# Patient Record
Sex: MALE | Race: WHITE
[De-identification: names, ages, dates, MRNs, and addresses within clinical notes are randomized per-mention and may not be internally consistent; named-entity substitution may affect disease eponyms.]

---

## 2019-11-23 ENCOUNTER — HOSPITAL ENCOUNTER (EMERGENCY)
Dept: HOSPITAL 47 - EC | Age: 39
Discharge: HOME | End: 2019-11-23
Payer: COMMERCIAL

## 2019-11-23 VITALS — TEMPERATURE: 98 F

## 2019-11-23 VITALS — RESPIRATION RATE: 16 BRPM | HEART RATE: 71 BPM | DIASTOLIC BLOOD PRESSURE: 86 MMHG | SYSTOLIC BLOOD PRESSURE: 118 MMHG

## 2019-11-23 DIAGNOSIS — S22.31XA: Primary | ICD-10-CM
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
## 2019-11-23 DIAGNOSIS — Z91.018: ICD-10-CM

## 2019-11-23 DIAGNOSIS — Z88.0: ICD-10-CM
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
## 2019-11-23 DIAGNOSIS — Z53.20: ICD-10-CM

## 2019-11-23 PROCEDURE — 99284 EMERGENCY DEPT VISIT MOD MDM: CPT

## 2019-11-23 NOTE — XR
EXAMINATION TYPE: PA chest and right rib series

 

DATE OF EXAM: 11/23/2019

 

COMPARISON: None

 

HISTORY: 38-year-old male with fall and rib pain

 

TECHNIQUE: 5 views

 

FINDINGS:  

Heart upper limits of normal in size. Aorta within normal limits. Mild interstitial prominence as a c
hronic appearance. No consolidation, pneumothorax, or pleural effusion.

 

Subtle nondisplaced fracture of the right anterolateral seventh rib.

 

 

IMPRESSION:  

Subtle nondisplaced fracture of the right anterolateral seventh rib. No underlying acute cardiopulmon
christopher process seen.

## 2019-11-23 NOTE — ED
General Adult HPI





- General


Chief complaint: Abdominal Pain


Stated complaint: Rib Pain


Time Seen by Provider: 11/23/19 19:07


Source: patient


Mode of arrival: ambulatory


Limitations: no limitations





- History of Present Illness


Initial comments: 


38-year-old male patient presents to the emergency department today for 

evaluation of right lateral rib pain.  Patient states around 5:30 this afternoon

he was jumping up to grab onto gymnastic rings when he fell landing on the right

side.  States that it was on and on padded region of the floor.  Denies hitting 

his head or losing consciousness.  Denies any neck or back pain.  Patient states

he is having increased pain to the area with deep breathing.  Denies any cough 

or hemoptysis.  Denies any shortness of breath.  Denies any abdominal pain.  

Denies hematuria. Patient denies any headache, chest pain, shortness of breath, 

dizziness, weakness, abdominal pain, nausea, vomiting, or difficulties with 

bowel movements or urination.








- Related Data


                                Home Medications











 Medication  Instructions  Recorded  Confirmed


 


Multivitamins, Thera [Multivitamin] 1 each PO DAILY 09/13/16 09/14/16








                                  Previous Rx's











 Medication  Instructions  Recorded


 


Hydrocodone/Acetaminophen [Norco 1 tab PO Q6HR PRN #12 tab 11/23/19





5-325]  


 


Ibuprofen [Motrin] 600 mg PO Q8HR PRN #30 tab 11/23/19


 


Lidocaine 5% Patch [Lidoderm] 1 patch TOPICAL DAILY #30 patch 11/23/19











                                    Allergies











Allergy/AdvReac Type Severity Reaction Status Date / Time


 


almond Allergy  Nausea & Verified 11/23/19 18:57





   Vomiting  


 


clams Allergy  Unknown Verified 11/23/19 18:57


 


Penicillins Allergy  Unknown Verified 09/13/16 09:07





   Childhood  














Review of Systems


ROS Statement: 


Those systems with pertinent positive or pertinent negative responses have been 

documented in the HPI.





ROS Other: All systems not noted in ROS Statement are negative.





Past Medical History


Past Medical History: No Reported History


History of Any Multi-Drug Resistant Organisms: None Reported


Past Surgical History: No Surgical Hx Reported


Past Anesthesia/Blood Transfusion Reactions: No Reported Reaction


Additional Past Anesthesia/Blood Transfusion Reaction / Comment(s): no prior sx 

hx


Past Psychological History: No Psychological Hx Reported


Smoking Status: Never smoker


Past Alcohol Use History: Occasional


Past Drug Use History: None Reported





- Past Family History


  ** Mother


Family Medical History: Cancer





  ** Father


Family Medical History: Cancer





General Exam


Limitations: no limitations


General appearance: alert, in no apparent distress, other (This is a well-

developed, well-nourished adult male patient in no acute distress.  Vital signs 

upon presentation are temperature 98.0F, pulse 72, respirations 18, blood 

pressure 116/79, pulse ox 99% on room air per)


Eye exam: Present: normal appearance, PERRL, EOMI.  Absent: scleral icterus, 

conjunctival injection, periorbital swelling


ENT exam: Present: normal exam, normal oropharynx, mucous membranes moist


Neck exam: Present: normal inspection, other (Nontender, no step-off, no 

deformity to firm midline palpation of the posterior cervical spine.  Full range

 of motion without pain or limitation.).  Absent: tenderness, meningismus, 

lymphadenopathy


Respiratory exam: Present: normal lung sounds bilaterally, chest wall tenderness

 (Right lateral ribs).  Absent: respiratory distress, wheezes, rales, rhonchi, 

stridor


Cardiovascular Exam: Present: regular rate, normal rhythm, normal heart sounds. 

 Absent: systolic murmur, diastolic murmur, rubs, gallop, clicks


GI/Abdominal exam: Present: soft, normal bowel sounds.  Absent: distended, 

tenderness, guarding, rebound, rigid


Back exam: Present: other (Nontender, no step-off, no deformity to firm midline 

palpation of the thoracic and lumbar vertebrae. Full range of motion without 

pain or limitation.).  Absent: CVA tenderness (R), CVA tenderness (L)


Neurological exam: Present: alert, oriented X3, CN II-XII intact


Psychiatric exam: Present: normal affect, normal mood


Skin exam: Present: warm, dry, intact, normal color.  Absent: rash





Course


                                   Vital Signs











  11/23/19 11/23/19





  18:53 20:44


 


Temperature 98.0 F 98.0 F


 


Pulse Rate 72 71


 


Respiratory 18 16





Rate  


 


Blood Pressure 116/79 118/86


 


O2 Sat by Pulse 99 98





Oximetry  














Medical Decision Making





- Medical Decision Making


38-year-old male patient presents to the emergency department today for 

evaluation of right rib pain after experiencing a fall.  Physical examination 

did reveal tenderness over the right lateral ribs.  Lungs are clear to 

auscultation with good air movement.  X-rays were obtained and did reveal a 

nondisplaced fracture of the right seventh rib.  Patient was given incentive 

spirometer education.  He is given prescriptions for pain management.  He is 

instructed follow up with his primary care physician for recheck in 1-2 days.  

Return parameters discussed in detail.  He verbalizes understanding and agrees 

with this plan.








- Radiology Data


Radiology results: report reviewed, image reviewed


5 views of the right ribs and chest are obtained.  Report was reviewed in its 

entirety.  Impression by Dr. Rouse shows subtle nondisplaced fracture of the 

right anterolateral seventh rib.  No underlying acute cardiopulmonary process 

seen.





Disposition


Clinical Impression: 


 Right rib fracture





Disposition: HOME SELF-CARE


Condition: Good


Instructions (If sedation given, give patient instructions):  Rib Fracture (ED)


Additional Instructions: 


Take medications as directed.  Perform incentive spirometry and coughing and 

deep breathing exercises as directed while awake.  Follow-up with the primary 

care physician for recheck in 1-2 days.  Return to the emergency department 

immediately for any new, worsening, or concerning symptoms.


Prescriptions: 


Lidocaine 5% Patch [Lidoderm] 1 patch TOPICAL DAILY #30 patch


Ibuprofen [Motrin] 600 mg PO Q8HR PRN #30 tab


 PRN Reason: Pain


Hydrocodone/Acetaminophen [Norco 5-325] 1 tab PO Q6HR PRN #12 tab


 PRN Reason: Pain


Is patient prescribed a controlled substance at d/c from ED?: Yes


When asked, does pt state using other controlled substances?: No


If prescribed controlled substance>3 days was MAPS reviewed?: Prescribed <3 Days


If opioid is for acute pain is fill amount 7 days or less?: Yes


If Rx opioid, was Start Talking consent form obtained?: Yes


Referrals: 


None,Stated [Primary Care Provider] - 1-2 days


Time of Disposition: 20:07

## 2022-12-11 ENCOUNTER — HOSPITAL ENCOUNTER (OUTPATIENT)
Dept: HOSPITAL 47 - EC | Age: 42
Setting detail: OBSERVATION
LOS: 1 days | Discharge: HOME | End: 2022-12-12
Attending: INTERNAL MEDICINE | Admitting: INTERNAL MEDICINE
Payer: COMMERCIAL

## 2022-12-11 DIAGNOSIS — G45.9: Primary | ICD-10-CM

## 2022-12-11 DIAGNOSIS — E78.5: ICD-10-CM

## 2022-12-11 DIAGNOSIS — Z88.0: ICD-10-CM

## 2022-12-11 DIAGNOSIS — Z81.2: ICD-10-CM

## 2022-12-11 DIAGNOSIS — Z82.3: ICD-10-CM

## 2022-12-11 DIAGNOSIS — Z81.1: ICD-10-CM

## 2022-12-11 DIAGNOSIS — Z80.9: ICD-10-CM

## 2022-12-11 DIAGNOSIS — Z82.49: ICD-10-CM

## 2022-12-11 DIAGNOSIS — Z79.899: ICD-10-CM

## 2022-12-11 DIAGNOSIS — I37.1: ICD-10-CM

## 2022-12-11 DIAGNOSIS — I07.1: ICD-10-CM

## 2022-12-11 DIAGNOSIS — Z79.82: ICD-10-CM

## 2022-12-11 DIAGNOSIS — Z83.3: ICD-10-CM

## 2022-12-11 LAB
ALBUMIN SERPL-MCNC: 4.6 G/DL (ref 3.5–5)
ALP SERPL-CCNC: 63 U/L (ref 38–126)
ALT SERPL-CCNC: 16 U/L (ref 4–49)
ANION GAP SERPL CALC-SCNC: 10 MMOL/L
APTT BLD: 24.1 SEC (ref 22–30)
AST SERPL-CCNC: 14 U/L (ref 17–59)
BASOPHILS # BLD AUTO: 0 K/UL (ref 0–0.2)
BASOPHILS NFR BLD AUTO: 1 %
BUN SERPL-SCNC: 15 MG/DL (ref 9–20)
CALCIUM SPEC-MCNC: 9.5 MG/DL (ref 8.4–10.2)
CHLORIDE SERPL-SCNC: 104 MMOL/L (ref 98–107)
CHOLEST SERPL-MCNC: 178 MG/DL (ref 0–200)
CO2 SERPL-SCNC: 26 MMOL/L (ref 22–30)
EOSINOPHIL # BLD AUTO: 0.2 K/UL (ref 0–0.7)
EOSINOPHIL NFR BLD AUTO: 4 %
ERYTHROCYTE [DISTWIDTH] IN BLOOD BY AUTOMATED COUNT: 5.11 M/UL (ref 4.3–5.9)
ERYTHROCYTE [DISTWIDTH] IN BLOOD: 12.2 % (ref 11.5–15.5)
GLUCOSE BLD-MCNC: 109 MG/DL (ref 70–110)
GLUCOSE SERPL-MCNC: 107 MG/DL (ref 74–99)
HCT VFR BLD AUTO: 42.7 % (ref 39–53)
HDLC SERPL-MCNC: 53.7 MG/DL (ref 40–60)
HGB BLD-MCNC: 15.8 GM/DL (ref 13–17.5)
INR PPP: 1 (ref ?–1.2)
LDLC SERPL CALC-MCNC: 111.6 MG/DL (ref 0–131)
LYMPHOCYTES # SPEC AUTO: 1.3 K/UL (ref 1–4.8)
LYMPHOCYTES NFR SPEC AUTO: 34 %
MCH RBC QN AUTO: 30.8 PG (ref 25–35)
MCHC RBC AUTO-ENTMCNC: 36.9 G/DL (ref 31–37)
MCV RBC AUTO: 83.5 FL (ref 80–100)
MONOCYTES # BLD AUTO: 0.2 K/UL (ref 0–1)
MONOCYTES NFR BLD AUTO: 6 %
NEUTROPHILS # BLD AUTO: 2 K/UL (ref 1.3–7.7)
NEUTROPHILS NFR BLD AUTO: 52 %
PLATELET # BLD AUTO: 183 K/UL (ref 150–450)
POTASSIUM SERPL-SCNC: 4.4 MMOL/L (ref 3.5–5.1)
PROT SERPL-MCNC: 7.3 G/DL (ref 6.3–8.2)
PT BLD: 10.9 SEC (ref 9–12)
SODIUM SERPL-SCNC: 140 MMOL/L (ref 137–145)
TRIGL SERPL-MCNC: 63.5 MG/DL (ref 0–149)
VLDLC SERPL CALC-MCNC: 12.7 MG/DL (ref 5–40)
WBC # BLD AUTO: 3.9 K/UL (ref 3.8–10.6)

## 2022-12-11 PROCEDURE — 70551 MRI BRAIN STEM W/O DYE: CPT

## 2022-12-11 PROCEDURE — 85730 THROMBOPLASTIN TIME PARTIAL: CPT

## 2022-12-11 PROCEDURE — 95816 EEG AWAKE AND DROWSY: CPT

## 2022-12-11 PROCEDURE — 82375 ASSAY CARBOXYHB QUANT: CPT

## 2022-12-11 PROCEDURE — 99285 EMERGENCY DEPT VISIT HI MDM: CPT

## 2022-12-11 PROCEDURE — 94760 N-INVAS EAR/PLS OXIMETRY 1: CPT

## 2022-12-11 PROCEDURE — 70450 CT HEAD/BRAIN W/O DYE: CPT

## 2022-12-11 PROCEDURE — 80061 LIPID PANEL: CPT

## 2022-12-11 PROCEDURE — 71046 X-RAY EXAM CHEST 2 VIEWS: CPT

## 2022-12-11 PROCEDURE — 85610 PROTHROMBIN TIME: CPT

## 2022-12-11 PROCEDURE — 83036 HEMOGLOBIN GLYCOSYLATED A1C: CPT

## 2022-12-11 PROCEDURE — 80053 COMPREHEN METABOLIC PANEL: CPT

## 2022-12-11 PROCEDURE — 83735 ASSAY OF MAGNESIUM: CPT

## 2022-12-11 PROCEDURE — 36415 COLL VENOUS BLD VENIPUNCTURE: CPT

## 2022-12-11 PROCEDURE — 93005 ELECTROCARDIOGRAM TRACING: CPT

## 2022-12-11 PROCEDURE — 70496 CT ANGIOGRAPHY HEAD: CPT

## 2022-12-11 PROCEDURE — 84484 ASSAY OF TROPONIN QUANT: CPT

## 2022-12-11 PROCEDURE — 85025 COMPLETE CBC W/AUTO DIFF WBC: CPT

## 2022-12-11 PROCEDURE — 70498 CT ANGIOGRAPHY NECK: CPT

## 2022-12-11 PROCEDURE — 93306 TTE W/DOPPLER COMPLETE: CPT

## 2022-12-11 RX ADMIN — CEFAZOLIN SCH MLS/HR: 330 INJECTION, POWDER, FOR SOLUTION INTRAMUSCULAR; INTRAVENOUS at 15:37

## 2022-12-11 NOTE — CT
EXAMINATION TYPE: CT angio head neck

 

DATE OF EXAM: 12/11/2022

 

COMPARISON: None

 

HISTORY: stroke

 

CT DLP: 600.3 mGycm

 

CONTRAST: 

Performed with IV Contrast, patient injected with 65 mL of Isovue 370.

 

Combination Contrast CTA cervical carotids and [place] CTA cervical carotids with 3-D recons
truction

 

Contrast CTA of the cervical carotids was performed 3-D reconstruction imaging obtained at a separate
 workstation.  

 

Right carotid system: No significant plaque is seen of the right common carotid artery.  There is No 
significant plaque also noted at the carotid bulb and proximal ICA.  No significant diameter reductio
n.  ECA is patent.  Right vertebral artery appears unremarkable.  

 

Left carotid system: No significant plaque is seen of the left common carotid artery.  There is No si
gnificant plaque also noted at the carotid bulb and proximal ICA.  No significant diameter reduction.
  ECA is patent. Left vertebral artery appears unremarkable. 

 

IMPRESSION: 

 

1. No significant diameter reduction to account for the patient's symptoms. 

 

CTA [place] with 3-D reconstruction

 

Contrast CTA of the [place] was performed 3-D reconstruction imaging obtained at a separate 
workstation.  

 

Vertebrobasilar system as well as intracranial portions of the internal carotid arteries and their ma
andie tributaries are patent. Diminutive right vertebral artery. I do not see evidence for sizable aneu
rysm or vascular malformation.  Please note MRI provides greater sensitivity and specificity.  Visual
ized brain appears grossly unremarkable. 

 

IMPRESSION:

 

1. There is diminutive right vertebral artery. Otherwise unremarkable study.

 

NASCET criteria was used in interpretation of this exam?

## 2022-12-11 NOTE — CT
EXAMINATION TYPE: CT brain wo con for TPA

 

DATE OF EXAM: 12/11/2022

 

COMPARISON: None

 

HISTORY: stroke

 

CT DLP: 1180.8 mGycm

 

Unenhanced CT of the brain was performed.  

 

The ventricles, basal cisterns and sulci overlying the cerebral convexities demonstrate a normal appe
arance.  

 

There is no evidence for intracranial hemorrhage or sulcal effacement.  

 

No mass effects are seen.  

 

Osseous calvarium is intact.

 

If symptoms persist consider MRI as clinically warranted.  

 

IMPRESSION:

 

1.  No acute intracranial process is seen at this time.

## 2022-12-11 NOTE — P.HPIM
History of Present Illness


H&P Date: 12/11/22


Chief Complaint: TIA





 42-year-old male with no PMH presenting to the emergency Department with 

blurred vision in the right eye, tongue numbness and aphasia. Onset of symptoms 

was around 9 AM. Patient was hunting at the time.  He states that he could think

what he wanted to say but he could not put it out.  Patient was unable to say 

his daughter's name. Patient also had a mild headache rated 3/10 bitemporal.  

The symptoms lasted for about an hour, currently resolved.  No focal weakness or

numbness. No difficulty with walking or extremity weakness. No history of 

similar symptoms previously. The father side of his family had problems with 

heart attacks and strokes, age of onset the 50s and 60s.





Evaluation in the emergency department revealed normal vital signs.  Labs 

unremarkable.  Head CT scan negative.  Head and neck CT angiogram negative.  

Chest x-ray unremarkable.





Review of Systems





Complete review of system performed, pertinent positives per HPI, otherwise 

negative





Past Medical History


Past Medical History: No Reported History


History of Any Multi-Drug Resistant Organisms: None Reported


Past Surgical History: No Surgical Hx Reported


Past Anesthesia/Blood Transfusion Reactions: No Reported Reaction


Additional Past Anesthesia/Blood Transfusion Reaction / Comment(s): no prior sx 

hx


Past Psychological History: No Psychological Hx Reported


Smoking Status: Never smoker


Past Alcohol Use History: Occasional


Past Drug Use History: None Reported





- Past Family History


  ** Mother


Family Medical History: Cancer





  ** Father


Family Medical History: Cancer





Medications and Allergies


                                Home Medications











 Medication  Instructions  Recorded  Confirmed  Type


 


No Known Home Medications  12/11/22 12/11/22 History








                                    Allergies











Allergy/AdvReac Type Severity Reaction Status Date / Time


 


clams Allergy  Unknown Verified 12/11/22 12:59


 


Penicillins Allergy  Unknown Verified 12/11/22 12:59





   Childhood  


 


almond AdvReac  Nausea & Verified 12/11/22 12:59





   Vomiting  














Physical Exam


Vitals: 


                                   Vital Signs











  Temp Pulse Resp BP Pulse Ox


 


 12/11/22 11:30   61  18  121/82  97


 


 12/11/22 10:58   65  18  116/77  99


 


 12/11/22 10:13  97.5 F L  64  18  134/84  100








                                Intake and Output











 12/10/22 12/11/22 12/11/22





 22:59 06:59 14:59


 


Other:   


 


  Weight   81.647 kg














Constitutional: No acute distress, conversant, pleasant


Eyes:Anicteric sclerae, moist conjunctiva, no lid-lag, PERRLA, 


ENMT: Oropharynx clear, no erythema, exudates


Neck: Supple, FROM, no masses, or JVD, No carotid bruits, No thyromegaly


Lungs: Clear to auscultation, Clear to percussion, Normal respiratory effort, no

accessory muscle use 


Cardiovascular: Heart regular in rate and rhythm, No murmurs, gallops, or rubs, 

No peripheral edema


Abdominal: Soft, Nontender, no guarding, rebound or rigidity, Normoactive bowel 

sounds, No hepatomegaly, No splenomegaly, No palpable mass 


Skin: Normal temperature, tone, texture, turgor, no induration, No subcutaneous 

nodules, No rash, lesions, No ulcers


Extremities: No digital cyanosis, No clubbing, Pedal pulses intact and 

symmetrical, Radial pulses intact and symmetrical, No calf tenderness 


Psychiatric: Alert and oriented to person, place and time, appropriate affect, 

intact judgement         


Neuro: Muscles Strength 5/5 in all 4 extremities, Sensation to light touch 

grossly present throughout, Cranial nerves II-XII grossly intact, no focal 

sensory deficits








Results


CBC & Chem 7: 


                                 12/11/22 10:40





                                 12/11/22 10:40


Labs: 


                  Abnormal Lab Results - Last 24 Hours (Table)











  12/11/22 Range/Units





  10:40 


 


Glucose  107 H  (74-99)  mg/dL


 


AST  14 L  (17-59)  U/L














Assessment and Plan


Plan: 





Transient ischemic attack


MRI brain


Echocardiogram


Monitor on telemetry


Check lipid profile and HbA1c


Neurology evaluation





Bitemporal headaches


Possibly related to above


Resolved





Admit to observation.

## 2022-12-11 NOTE — XR
EXAMINATION TYPE: XR chest 2V

 

DATE OF EXAM: 12/11/2022

 

COMPARISON: 11/23/19

 

HISTORY: Chest pain

 

TECHNIQUE:  Frontal and lateral views of the chest are obtained.

 

FINDINGS:  

 

There is no focal air space opacity.

 

No evidence for pneumothorax.  No pleural effusion.

 

The cardiac silhouette size is within normal limits.

 

The osseous structures are grossly intact.

 

IMPRESSION:  

 

1.  No acute cardiopulmonary process.

## 2022-12-11 NOTE — ED
General Adult HPI





- General


Chief complaint: Neuro Symptoms/Deficit


Stated complaint: Aphasia


Time Seen by Provider: 12/11/22 10:20


Source: patient, family, RN notes reviewed


Mode of arrival: ambulatory


Limitations: no limitations





- History of Present Illness


Initial comments: 





Patient is a pleasant 42-year-old male presenting to the emergency Department 

with facial numbness and aphasia.  Onset of symptoms was around 9 AM.  Patient 

was hunting at the time.  Patient felt odd sensation of his face.  Patient also 

had a mild headache rated 3/10 left temporal.  This headache does continue and 

does continue to be mild.  Not sudden onset or severe.  Patient did have some 

blurry vision from his right eye.  No difficulty with walking or extremity 

weakness.  Patient had difficulty getting his words out.  Patient was unable to 

say his daughter's name.  The symptoms have improved and patient and spouse feel

like he is talking normally at this time.  No odd facial sensation anymore.  No 

history of similar symptoms previously.  There was questionable history of 

seizure years ago.





- Related Data


                                Home Medications











 Medication  Instructions  Recorded  Confirmed


 


No Known Home Medications  12/11/22 12/11/22











                                    Allergies











Allergy/AdvReac Type Severity Reaction Status Date / Time


 


clams Allergy  Unknown Verified 12/11/22 12:59


 


Penicillins Allergy  Unknown Verified 12/11/22 12:59





   Childhood  


 


almond AdvReac  Nausea & Verified 12/11/22 12:59





   Vomiting  














Review of Systems


ROS Statement: 


Those systems with pertinent positive or pertinent negative responses have been 

documented in the HPI.





ROS Other: All systems not noted in ROS Statement are negative.


Constitutional: Denies: fever


Eyes: Denies: eye pain


ENT: Denies: ear pain


Respiratory: Denies: cough


Cardiovascular: Denies: chest pain


Endocrine: Denies: fatigue


Gastrointestinal: Denies: abdominal pain


Genitourinary: Denies: dysuria


Musculoskeletal: Denies: back pain


Skin: Denies: rash


Neurological: Reports: as per HPI, headache.  Denies: confusion





Past Medical History


Past Medical History: No Reported History


History of Any Multi-Drug Resistant Organisms: None Reported


Past Surgical History: No Surgical Hx Reported


Past Anesthesia/Blood Transfusion Reactions: No Reported Reaction


Additional Past Anesthesia/Blood Transfusion Reaction / Comment(s): no prior sx 

hx


Past Psychological History: No Psychological Hx Reported


Smoking Status: Never smoker


Past Alcohol Use History: Occasional


Past Drug Use History: None Reported





- Past Family History


  ** Mother


Family Medical History: Cancer





  ** Father


Family Medical History: Cancer





General Exam


Limitations: no limitations


General appearance: alert, in no apparent distress


Head exam: Present: atraumatic, normocephalic, other (No temporal tenderness to 

palpation)


Eye exam: Present: normal appearance, PERRL, EOMI


ENT exam: Present: normal oropharynx


Neck exam: Present: normal inspection


Respiratory exam: Present: normal lung sounds bilaterally


Cardiovascular Exam: Present: regular rate, normal rhythm


GI/Abdominal exam: Present: soft.  Absent: tenderness


Extremities exam: Present: normal inspection


Neurological exam: Present: alert, CN II-XII intact.  Absent: motor sensory 

deficit


  ** Expanded


Neurological exam: Present: protecting the airway


Speech: Present: fluid speech


Cranial nerves: EOM's Intact: Normal, Facial Sensation: Normal


Cerebellar function: Finger to Nose: Normal


Sensory exam: Upper Extremity Light Touch: Normal, Lower Extremity Light Touch: 

Normal


Motor strength exam: RUE: 5, LUE: 5, RLE: 5, LLE: 5


Eye Response: (4) open spontaneously


Motor Response: (6) obeys commands


Verbal Response: (5) oriented


Psychiatric exam: Present: normal affect, normal mood


Skin exam: Present: normal color





Course


                                   Vital Signs











  12/11/22 12/11/22 12/11/22





  10:13 10:58 11:30


 


Temperature 97.5 F L  


 


Pulse Rate 64 65 61


 


Respiratory 18 18 18





Rate   


 


Blood Pressure 134/84 116/77 121/82


 


O2 Sat by Pulse 100 99 97





Oximetry   














- Reevaluation(s)


Reevaluation #1: 





12/11/22 10:46


Patient is not being considered a TPA candidate secondary to resolution of 

symptoms.  Risks are felt to outweigh the benefit.


12/11/22 10:53


Dr. Valenzuela does confirm patient is not a TPA candidate





EKG Findings





- EKG Results:


EKG: interpreted by ERMD, sinus rhythm, normal axis, normal QRS, normal ST/T





Medical Decision Making





- Medical Decision Making





Patient was reevaluated and remained symptom-free.  Case was discussed with Dr. Bolden, who will admit covering hospital call.





- Lab Data


Result diagrams: 


                                 12/11/22 10:40





                                 12/11/22 10:40


                                   Lab Results











  12/11/22 12/11/22 12/11/22 Range/Units





  10:40 10:40 10:40 


 


WBC  3.9    (3.8-10.6)  k/uL


 


RBC  5.11    (4.30-5.90)  m/uL


 


Hgb  15.8    (13.0-17.5)  gm/dL


 


Hct  42.7    (39.0-53.0)  %


 


MCV  83.5    (80.0-100.0)  fL


 


MCH  30.8    (25.0-35.0)  pg


 


MCHC  36.9    (31.0-37.0)  g/dL


 


RDW  12.2    (11.5-15.5)  %


 


Plt Count  183    (150-450)  k/uL


 


MPV  7.5    


 


Neutrophils %  52    %


 


Lymphocytes %  34    %


 


Monocytes %  6    %


 


Eosinophils %  4    %


 


Basophils %  1    %


 


Neutrophils #  2.0    (1.3-7.7)  k/uL


 


Lymphocytes #  1.3    (1.0-4.8)  k/uL


 


Monocytes #  0.2    (0-1.0)  k/uL


 


Eosinophils #  0.2    (0-0.7)  k/uL


 


Basophils #  0.0    (0-0.2)  k/uL


 


Hyperchromasia  Slight    


 


PT   10.9   (9.0-12.0)  sec


 


INR   1.0   (<1.2)  


 


APTT   24.1   (22.0-30.0)  sec


 


Carbon Monoxide, Quant     (<10.0)  %


 


Sodium    140  (137-145)  mmol/L


 


Potassium    4.4  (3.5-5.1)  mmol/L


 


Chloride    104  ()  mmol/L


 


Carbon Dioxide    26  (22-30)  mmol/L


 


Anion Gap    10  mmol/L


 


BUN    15  (9-20)  mg/dL


 


Creatinine    1.00  (0.66-1.25)  mg/dL


 


Est GFR (CKD-EPI)AfAm    >90  (>60 ml/min/1.73 sqM)  


 


Est GFR (CKD-EPI)NonAf    >90  (>60 ml/min/1.73 sqM)  


 


Glucose    107 H  (74-99)  mg/dL


 


POC Glucose (mg/dL)     ()  mg/dL


 


POC Glu Operater ID     


 


Calcium    9.5  (8.4-10.2)  mg/dL


 


Total Bilirubin    1.3  (0.2-1.3)  mg/dL


 


AST    14 L  (17-59)  U/L


 


ALT    16  (4-49)  U/L


 


Alkaline Phosphatase    63  ()  U/L


 


Troponin I     (0.000-0.034)  ng/mL


 


Total Protein    7.3  (6.3-8.2)  g/dL


 


Albumin    4.6  (3.5-5.0)  g/dL














  12/11/22 12/11/22 12/11/22 Range/Units





  10:40 10:40 10:58 


 


WBC     (3.8-10.6)  k/uL


 


RBC     (4.30-5.90)  m/uL


 


Hgb     (13.0-17.5)  gm/dL


 


Hct     (39.0-53.0)  %


 


MCV     (80.0-100.0)  fL


 


MCH     (25.0-35.0)  pg


 


MCHC     (31.0-37.0)  g/dL


 


RDW     (11.5-15.5)  %


 


Plt Count     (150-450)  k/uL


 


MPV     


 


Neutrophils %     %


 


Lymphocytes %     %


 


Monocytes %     %


 


Eosinophils %     %


 


Basophils %     %


 


Neutrophils #     (1.3-7.7)  k/uL


 


Lymphocytes #     (1.0-4.8)  k/uL


 


Monocytes #     (0-1.0)  k/uL


 


Eosinophils #     (0-0.7)  k/uL


 


Basophils #     (0-0.2)  k/uL


 


Hyperchromasia     


 


PT     (9.0-12.0)  sec


 


INR     (<1.2)  


 


APTT     (22.0-30.0)  sec


 


Carbon Monoxide, Quant    3.1  (<10.0)  %


 


Sodium     (137-145)  mmol/L


 


Potassium     (3.5-5.1)  mmol/L


 


Chloride     ()  mmol/L


 


Carbon Dioxide     (22-30)  mmol/L


 


Anion Gap     mmol/L


 


BUN     (9-20)  mg/dL


 


Creatinine     (0.66-1.25)  mg/dL


 


Est GFR (CKD-EPI)AfAm     (>60 ml/min/1.73 sqM)  


 


Est GFR (CKD-EPI)NonAf     (>60 ml/min/1.73 sqM)  


 


Glucose     (74-99)  mg/dL


 


POC Glucose (mg/dL)   109   ()  mg/dL


 


POC Glu Operater ID   Stoney, Charisma   


 


Calcium     (8.4-10.2)  mg/dL


 


Total Bilirubin     (0.2-1.3)  mg/dL


 


AST     (17-59)  U/L


 


ALT     (4-49)  U/L


 


Alkaline Phosphatase     ()  U/L


 


Troponin I  <0.012    (0.000-0.034)  ng/mL


 


Total Protein     (6.3-8.2)  g/dL


 


Albumin     (3.5-5.0)  g/dL














- Radiology Data


Radiology results: report reviewed (CT brain does not reveal acute abnormality. 

CT angios shows diminutive right vertebral artery,other wise unremarkable 

study.)


Interpreted by me: 





chest x-ray shows no acute process





Disposition


Clinical Impression: 


 Transient cerebral ischemia





Disposition: ADMITTED AS IP TO THIS HOSP


Is patient prescribed a controlled substance at d/c from ED?: No


Referrals: 


None,Stated [Primary Care Provider] - 1-2 days


Time of Disposition: 13:17

## 2022-12-12 VITALS
RESPIRATION RATE: 16 BRPM | DIASTOLIC BLOOD PRESSURE: 76 MMHG | SYSTOLIC BLOOD PRESSURE: 112 MMHG | HEART RATE: 57 BPM | TEMPERATURE: 97.4 F

## 2022-12-12 LAB
ALBUMIN SERPL-MCNC: 3.9 G/DL (ref 3.8–4.9)
ALBUMIN/GLOB SERPL: 2.17 G/DL (ref 1.6–3.17)
ALP SERPL-CCNC: 53 U/L (ref 41–126)
ALT SERPL-CCNC: 7 U/L (ref 10–49)
ANION GAP SERPL CALC-SCNC: 7.7 MMOL/L (ref 10–18)
AST SERPL-CCNC: 5 U/L (ref 14–35)
BASOPHILS # BLD AUTO: 0.02 X 10*3/UL (ref 0–0.1)
BASOPHILS NFR BLD AUTO: 0.4 %
BUN SERPL-SCNC: 16.8 MG/DL (ref 9–27)
BUN/CREAT SERPL: 16.8 RATIO (ref 12–20)
CALCIUM SPEC-MCNC: 9.3 MG/DL (ref 8.7–10.3)
CHLORIDE SERPL-SCNC: 110 MMOL/L (ref 96–109)
CO2 SERPL-SCNC: 26.3 MMOL/L (ref 20–27.5)
EOSINOPHIL # BLD AUTO: 0.22 X 10*3/UL (ref 0.04–0.35)
EOSINOPHIL NFR BLD AUTO: 4.6 %
ERYTHROCYTE [DISTWIDTH] IN BLOOD BY AUTOMATED COUNT: 4.57 X 10*6/UL (ref 4.4–5.6)
ERYTHROCYTE [DISTWIDTH] IN BLOOD: 11.9 % (ref 11.5–14.5)
GLOBULIN SER CALC-MCNC: 1.8 G/DL (ref 1.6–3.3)
GLUCOSE SERPL-MCNC: 104 MG/DL (ref 70–110)
HCT VFR BLD AUTO: 39.6 % (ref 39.6–50)
HGB BLD-MCNC: 14.1 G/DL (ref 13–17)
IMM GRANULOCYTES BLD QL AUTO: 0.4 %
LYMPHOCYTES # SPEC AUTO: 1.81 X 10*3/UL (ref 0.9–5)
LYMPHOCYTES NFR SPEC AUTO: 37.7 %
MAGNESIUM SPEC-SCNC: 2 MG/DL (ref 1.5–2.4)
MCH RBC QN AUTO: 30.9 PG (ref 27–32)
MCHC RBC AUTO-ENTMCNC: 35.6 G/DL (ref 32–37)
MCV RBC AUTO: 86.7 FL (ref 80–97)
MONOCYTES # BLD AUTO: 0.45 X 10*3/UL (ref 0.2–1)
MONOCYTES NFR BLD AUTO: 9.4 %
NEUTROPHILS # BLD AUTO: 2.28 X 10*3/UL (ref 1.8–7.7)
NEUTROPHILS NFR BLD AUTO: 47.5 %
NRBC BLD AUTO-RTO: 0 /100 WBCS (ref 0–0)
PLATELET # BLD AUTO: 190 X 10*3/UL (ref 140–440)
POTASSIUM SERPL-SCNC: 4.5 MMOL/L (ref 3.5–5.5)
PROT SERPL-MCNC: 5.7 G/DL (ref 6.2–8.2)
SODIUM SERPL-SCNC: 144 MMOL/L (ref 135–145)
WBC # BLD AUTO: 4.8 X 10*3/UL (ref 4.5–10)

## 2022-12-12 RX ADMIN — CEFAZOLIN SCH MLS/HR: 330 INJECTION, POWDER, FOR SOLUTION INTRAMUSCULAR; INTRAVENOUS at 03:04

## 2022-12-12 RX ADMIN — CEFAZOLIN SCH: 330 INJECTION, POWDER, FOR SOLUTION INTRAMUSCULAR; INTRAVENOUS at 12:39

## 2022-12-12 NOTE — P.DS
Providers


Date of admission: 


12/11/22 13:06





Expected date of discharge: 12/12/22


Attending physician: 


Roxane Yun MD





Consults: 





                                        





12/11/22 13:07


Consult Physician Routine 


   Consulting Provider: Joni Rashid


   Consult Reason/Comments: tia


   Do you want consulting provider notified?: Yes











Primary care physician: 


Stated None





Hospital Course: 





Discharge Diagnosis:





Episodic headache, blurred vision, and expressive aphasia.  Unclear etiology.  

TIA vs complex migraine vs complications of carbon monoxide.  Workup negative 

including CT head, CTA head and neck, MRI brain, and EEG.  Lipid profile and 

hemoglobin A1c were also unremarkable.  Patient being discharged home on aspirin

and atorvastatin as recommended by neurology and to follow up outpatient with 

PCP in 1-2 days and cardiology in 1 week for further evaluation and possible 

consideration of a Holter/event monitor.





Slightly elevated carbon monoxide level in a nonsmoker, patient instructed to 

ensure adequate circulation of air when using propane heater in enclosed hunting

blind, may also consider taking a battery operated carbon monoxide detector 

during times of use.





History of questionable isolated seizure, EEG negative.





Hospital Course: 





Patient is a very pleasant 42-year-old male with no known reported past medical 

history with the exception of a questionable isolated seizure years ago at the 

age of 17.  He presented to the emergency department with a chief complaint of a

experiencing an episodic bilateral temporal headache worse on the left, mild 

blurred vision of his right eye, a strange tingling sensation to his tongue and 

some aphasia having difficulties getting his words out.  Patient was reportedly 

sitting in an enclosed hunting blind with a propane heater at time of this came 

on and returned home where the symptoms continued and he noticed he was unable 

to state his daughter's name out loud, saying that he knew what he was trying to

say but the words would not come out and this is when his wife took him to the 

hospital for evaluation.  Patient and wife report symptoms lasted approximately 

less than one hour prior to full resolution.  Upon arrival to the emergency 

department patient had full resolution of symptoms.  Vital signs were 

unremarkable.  CBC, coags, and CMP were unremarkable.  Troponin negative at less

than 0.012.  CT brain was negative for acute intercranial process.  CTA head 

negative for acute intercranial process.  CTA neck negative showing no 

significant plaque.  EKG showing normal sinus rhythm at 62 bpm with no noted T-

wave or ST abnormalities showing no signs of acute ischemia.  Venous carbon 

monoxide quantitative slightly elevated at 3.1% as patient is a nonsmoker and 

denies smoking history.  Patient was admitted under the services with 

consultation to neurology.  Patient monitored overnight and all symptoms 

remained resolved since arrival to our facility.  Morning labs unremarkable.  

Hemoglobin A1c 5%.  Lipid profile also unremarkable.  MRI brain completed 

reporting to be fairly unremarkable study with no evidence of recent infarct.  

EEG was completed and negative for epileptiform activity.  Echocardiogram 

completed showing EF of 55-60% with no structural or valvular abnormalities.  

Neurology recommending patient continue daily aspirin 81 mg daily and atorvasta

tin 10 mg daily and to follow up with cardiology for evaluation of possible 

Holter/event monitor.  Medically patient is stable for discharge at this time 

and free from any questions, concerns, or complaints at this time.  Patient to 

follow up outpatient with PCP in 1-2 days and cardiology in 1 week.





Physical exam:





Patient seen and examined at bedside.





Vital signs reviewed and stable. 


General: Nontoxic, no distress and appears stated age.  


Derm: Skin warm and dry, normal coloration for ethnicity.


Head: Atraumatic, normocephalic and symmetric.  


Eyes: EOMs intact, no lid lag, and anicteric sclera


Mouth: no lip lesions, mucus membranes moist


Cardiovascular: regular rate and rhythm with normal S1S2, no murmur, positive 

posterior tibial pulses bilaterally, and cap refill < 2 seconds.  


Lungs: Respirations even, regular, and unlabored on room air. Lungs CTA 

bilaterally, no rhonchi, no rales, no wheezing, and no accessory muscle usage. 


Abdominal: soft, nontender to palpation, no guarding, no appreciable 

organomegaly


Ext: ROM intact. No gross muscle atrophy, no edema, no contractures


Neuro: Speech clear, face symmetrical and CN II-XII grossly intact, GCS 15 with 

no noted neurological deficits.


Psych: Alert and oriented to person, place, time, and situation. Appropriate and

pleasant affect. 








A total of 33 minutes of time were spent preparing this complex discharge 

summary.





Pt was discharged on 12/12/22 at 3:19 PM.














Patient Condition at Discharge: Stable





Plan - Discharge Summary


Discharge Rx Participant: No


New Discharge Prescriptions: 


New


   Atorvastatin [Lipitor] 10 mg PO HS 30 Days #30 tab


   Aspirin 81 mg PO DAILY 30 Days #30 tab


Discharge Medication List





Aspirin 81 mg PO DAILY 30 Days #30 tab 12/12/22 [Rx]


Atorvastatin [Lipitor] 10 mg PO HS 30 Days #30 tab 12/12/22 [Rx]








Follow up Appointment(s)/Referral(s): 


Tato Nelson MD [STAFF PHYSICIAN] - 1 Week (Appointment for evaluation and 

possible event monitor placement. )


Mj Kim MD [REFERRING] - 1-2 Days


Patient Instructions/Handouts:  Transient Ischemic Attack (DC)


Activity/Diet/Wound Care/Special Instructions: 





Activity:





As tolerated.  Take breaks as needed.





Diet: 





Heart healthy and carb consistent diet. Avoid salts, or foods with hidden salts 

such as canned or boxed foods and frozen dinners. Extra salt makes your heart 

work harder and traps the fluid in your body for longer.





Special Instructions:  





Take all of your medications as directed and remember to keep all of your 

doctor's appointments and follow-up as needed.  





As discussed, you will need to follow up outpatient with cardiology to review 

echocardiogram results as these are not available at time of discharge.





Please ensure you have adequate circulation of air when using propane heater in 

your enclosed hunting blind, may also consider taking a battery operated carbon 

monoxide detector with you during these times.





Thank you for allowing us to participate in your care, it was truly a pleasure 

having you for our patient!!! 





Discharge Disposition: HOME SELF-CARE

## 2022-12-12 NOTE — P.CNNES
History of Present Illness


Consult date: 12/12/22


Requesting physician: Roxane Yun


Reason for Consult: TIA


History of Present Illness: 





Patient is a 42-year-old right-handed male otherwise healthy, came to the 

hospital yesterday at 10:09 AM for possible TIA.  Patient's wife was also 

present, who also provided with a history.  Yesterday patient was outside in the

[place] home, trying to do dear hunting, when he started having a headache 

involving bitemporal region, left more than right.  He looked at the phone, and 

noticed blurred vision mainly on the right side.  It was worse with a close 

vision as compared to do the distant vision.  At one time it appeared like 

double vision although he later clarified it as blurred vision.  He came back 

home, and was getting ready to take Yvette presents been he started saying 

something, but the words were slurred and did not come out right.  He could not 

say name of his daughter.  He was mentally clear, knew what he wanted to say, 

but the words came out as mumbled jumbled.  He also noticed tingling of the 

tongue, and felt was involving the entire tongue.  This paresthesias of the 

tongue lasted for about half an hour, whereas his speech difficulty lasted for 

an hour.  There was no facial droop, focal weakness, any numbness or tingling of

the extremities or any problem with the balance.  He continued to walk with a 

normal gait.  Patient's wife brought him to the hospital.





Vital signs on arrival blood pressure 134/84, pulse rate 64 temperature 97.5, 

blood test was normal CBC, PT/PTT, carbon monoxide is normal, Chem-20 is normal.

 CT head showed no acute intracranial process.  Chest x-ray is normal.  EKG with

normal sinus rhythm.  Patient's wife believes that his blood pressure has been 

remained stable in the hospital.  He does not smoke, does not drink alcohol or 

marijuana.





Patient does not take any medication at home.  He does not take any antiplatelet

medication.  Denies any previous history of migraines or family history of 

migraines.  He does have strong family history of cardiac and stroke history in 

his father's side, who have their own risk factors of obesity, alcoholism, 

tobacco and diabetes.  Patient has history of closed head injury, when he fell 

off the ladder at age 17.  He hit his head on the concrete.  He was given 

Dilantin for couple months, and was taken off.  He never had a seizure.  He 

denies excessive caffeine intake except for 2 cups of coffee per day.





Review of Systems


Constitutional: Denies chills, Denies fever


Eyes: bilateral blurred vision, bilateral diplopia (Looking to the right), 

denies pain, denies loss of vision


Ears: deny: decreased hearing


Ears, nose, mouth and throat: Reports headache, Denies sore throat


Cardiovascular: Denies chest pain, Denies shortness of breath


Respiratory: Denies cough


Gastrointestinal: Denies abdominal pain, Denies diarrhea, Denies nausea, Denies 

vomiting


Musculoskeletal: Denies myalgias


Integumentary: Denies pruritus, Denies rash


Neurological: Reports as per HPI


Psychiatric: Denies anxiety, Denies depression


Endocrine: Denies fatigue, Denies weight change


Hematologic/Lymphatic: Denies easy bruising


Allergic/Immunologic: Denies persistent infections





Past Medical History


Past Medical History: No Reported History


Additional Past Medical History / Comment(s): Patient fell at 17 yrs old from a 

hay pile and lost consciousness, Drs thought he might have had a seizure and was

on Dilantin for a period of time


History of Any Multi-Drug Resistant Organisms: None Reported


Past Surgical History: No Surgical Hx Reported


Past Anesthesia/Blood Transfusion Reactions: No Reported Reaction


Additional Past Anesthesia/Blood Transfusion Reaction / Comment(s): no prior sx 

hx


Past Psychological History: No Psychological Hx Reported


Smoking Status: Never smoker


Past Alcohol Use History: Occasional


Past Drug Use History: None Reported





- Past Family History


  ** Mother


Family Medical History: Cancer





  ** Father


Family Medical History: Cancer





Medications and Allergies


                                Home Medications











 Medication  Instructions  Recorded  Confirmed  Type


 


No Known Home Medications  12/11/22 12/11/22 History








                                    Allergies











Allergy/AdvReac Type Severity Reaction Status Date / Time


 


clams Allergy  Unknown Verified 12/11/22 12:59


 


Penicillins Allergy  Unknown Verified 12/11/22 12:59





   Childhood  


 


almond AdvReac  Nausea & Verified 12/11/22 12:59





   Vomiting  














Physical Examination





- Vital Signs


Vital Signs: 


                                   Vital Signs











  Temp Pulse Pulse Resp BP BP Pulse Ox


 


 12/12/22 07:52        100


 


 12/12/22 07:50  97.4 F L   57 L  16   112/76  98


 


 12/12/22 02:25  97.8 F   54 L  19   102/87  100


 


 12/11/22 19:31     17   


 


 12/11/22 18:31  97.8 F   66  16   121/69  97


 


 12/11/22 17:00   76   16  109/98   96


 


 12/11/22 16:00   74   16  110/70   99


 


 12/11/22 15:00   82   18  113/69   97


 


 12/11/22 14:00   63   18  116/79   99


 


 12/11/22 13:00   61   16  111/75   100


 


 12/11/22 12:30   66   16  114/84   100


 


 12/11/22 12:00   62   18  112/79   99


 


 12/11/22 11:30   66   18  115/81   100


 


 12/11/22 10:58   65   18  116/77   99


 


 12/11/22 10:13  97.5 F L  64   18  134/84   100








                                Intake and Output











 12/11/22 12/12/22 12/12/22





 22:59 06:59 14:59


 


Other:   


 


  Voiding Method Toilet  


 


  # Voids 1 2 


 


  Weight 81.647 kg  














Patient is a middle aged  male, in no acute distress.


Patient is alert awake oriented to time place and person.  Speech and language 

functions are normal.  Patient can name and repeat very well.  No aphasia or 

dysarthria.  Attention, concentration and fund of knowledge is adequate. 





On cranial nerve examination, pupils are equal, round and reacting to light, 

visual fields are full on confrontation, with no neglect on double simultaneous 

stimulation.  Extraocular muscles are intact with no nystagmus.  Face is 

symmetric, tongue protrudes to the midline.  Palatal elevation and sensation 

normal, hearing and shoulder shrug normal, facial sensation normal.  





On muscle strength testing, there is no pronator drift and the strength is 

normal in arms and legs distally and proximally.





Deep tendon reflexes are symmetric somewhat hypoactive and plantars downgoing 

bilaterally.





Sensory to touch is equal with no neglect on double simultaneous stimulation.





Cerebellar function showed no ataxia for finger-to-nose testing.  No 

dysdiadochokinesia.  No ataxia for heel-to-shin testing on either side.  Tone 

and bulk of muscles normal.





Gait deferred..





On general examination, there is no carotid bruit or murmur, S1-S2 audible.  

Chest is clear on consultation.  Abdomen is soft nontender.  No organomegaly, 

bowel sounds present.   Peripheral pulses are present.  No edema.





Results





- Laboratory Findings


CBC and BMP: 


                                 12/12/22 04:45





                                 12/12/22 04:45


Abnormal Lab Findings: 


                                  Abnormal Labs











  12/11/22 12/12/22





  10:40 04:45


 


Chloride   110 H


 


Anion Gap   7.70 L


 


Glucose  107 H 


 


AST  14 L  5 L


 


ALT   7 L


 


Total Protein   5.7 L














Assessment and Plan


Assessment: 





* Possible TIA manifesting with transient blurred vision particularly looking to

  the right, slurred speech.  Symptoms resolved in an hour.  Migraine appears 

  less likely, as patient has no previous history or family history of 

  migraines.


* History of closed head injury at age 17 due to a fall from ladder, with no 

  residual deficits.


* Dyslipidemia


Plan: 





* Patient had a possible TIA.  Patient has been started on aspirin 325 mg.


* Agree with checking MRI of the brain evaluate for an acute stroke, 2-D echo to

  rule out any embolic source


* Telemetry monitoring, rule out arrhythmia


* Lipid panel with cholesterol 178 , HDL 53 and triglycerides 63.  

  Consider starting low-dose Lipitor 10 mg daily to target LDL < 


* Hemoglobin A1c 5.0


* CTA of the neck reported as diminutive right vertebral artery.  Otherwise 

  unremarkable study.


* CTA of the head showed no significant diameter reduction to account for 

  patient's symptoms.


* Check EEG to rule out any epileptiform activity.


* Neurology will follow.  Discussed with primary physician.  Thank you for the 

  consult.

## 2022-12-12 NOTE — MR
EXAMINATION TYPE: MR brain wo con

 

DATE OF EXAM: 12/12/2022

 

COMPARISON: CT brain from 1 day earlier

 

HISTORY: TIA. Altered mental status. Acute onset neurologic deficit on admission 1 day earlier.

 

TECHNIQUE: Multiplanar, multisequence imaging of the brain and brainstem is performed without IV cont
rast.

 

FINDINGS:  

Diffusion weighted images demonstrate no evidence of a recent infarct or other diffusion abnormality.


 

There is no extraaxial fluid collection or significant white matter signal abnormality.  The ventricu
lar system and cisternal spaces are normal in size and appearance.  The brain volume is age appropria
te. T2 Star weighted images show no suspicious intraparenchymal blood product.

 

Midline structures demonstrate normal morphology.  The craniocervical junction appears within normal 
limits. Normal vascular flow voids are present. Dominant left vertebral artery filling the basilar ar
unruly is noted. Small mucous retention cyst or polyp in the inferior right maxillary sinus is redemons
trated. The globes are intact bilaterally. 

 

IMPRESSION: No MRI evidence for a recent infarct. Fairly unremarkable study.

## 2022-12-12 NOTE — CA
Transthoracic Echo Report 

 Name: Blake Aldrich 

 MRN:    [id number] 

 Age:    42     Gender:     M 

 

 :    1980 

 Exam Date:     2022 14:51 

 Exam Location: [facility] 

 Ht (in):     66     Wt (lb):     180 

 Ordering Physician:        Roxane Yun MD 

 Attending/Referring Phys:         [id number], Court 

 Technician         Rona Khan RDCS 

 Procedure CPT: 

 Indications:       tia 

 

 Cardiac Hx: 

 Technical Quality:      Fair 

 Contrast 1:                                Total Dose (mL): 

 Contrast 2:                                Total Dose (mL): 

 

 MEASUREMENTS  (Male / Female) Normal Values 

 2D ECHO 

 LV Diastolic Diameter PLAX        4.1 cm                4.2 - 5.9 / 3.9 - 5.3 cm 

 LV Systolic Diameter PLAX         2.3 cm                 

 IVS Diastolic Thickness           1.4 cm                0.6 - 1.0 / 0.6 - 0.9 cm 

 LVPW Diastolic Thickness          1.2 cm                0.6 - 1.0 / 0.6 - 0.9 cm 

 LV Relative Wall Thickness        0.6                    

 RV Internal Dim ED PLAX           3.8 cm                 

 LA Volume                         43.4 cm???              18 - 58 / 22 - 52 cm??? 

 

 M-MODE 

 Aortic Root Diameter MM           2.9 cm                 

 LA Systolic Diameter MM           3.7 cm                 

 LA Ao Ratio MM                    1.3                    

 AV Cusp Separation MM             2.0 cm                 

 

 DOPPLER 

 AV Peak Velocity                  127.7 cm/s             

 AV Peak Gradient                  6.5 mmHg               

 AV Mean Velocity                  89.7 cm/s              

 AV Mean Gradient                  3.6 mmHg               

 AV Velocity Time Integral         26.3 cm                

 LVOT Peak Velocity                119.6 cm/s             

 LVOT Peak Gradient                5.7 mmHg               

 MV Area PHT                       3.3 cm???                

 Mitral E Point Velocity           122.9 cm/s             

 Mitral A Point Velocity           59.4 cm/s              

 Mitral E to A Ratio               2.1                    

 MV Deceleration Time              230.1 ms               

 MV E' Velocity                    11.6 cm/s              

 Mitral E to MV E' Ratio           10.6                   

 TR Peak Velocity                  247.6 cm/s             

 TR Peak Gradient                  24.5 mmHg              

 Right Ventricular Systolic Press  29.5 mmHg              

 

 

 FINDINGS 

 Left Ventricle 

 Mildly increased left ventricular wall thickness. Normal left ventricular  

 systolic function with no obvious regional wall motion abnormalities. Left  

 ventricular ejection fraction is estimated at 55-60 %. 

 

 Right Ventricle 

 Mild right ventricular dilatation. Right ventricular systolic pressure within  

 normal limits. 

 

 Right Atrium 

 Normal right atrial size. 

 

 Left Atrium 

 Normal left atrial size. No evidence for an atrial septal defect. 

 

 Mitral Valve 

 Structurally normal mitral valve. No mitral stenosis, regurgitation or  

 prolapse. 

 

 Aortic Valve 

 Trileaflet aortic valve. No aortic valve stenosis or regurgitation. 

 

 Tricuspid Valve 

 Structurally normal tricuspid valve. Mild tricuspid regurgitation. 

 

 Pulmonic Valve 

 Trace pulmonic regurgitation. 

 

 Pericardium 

 No pericardial effusion. 

 

 Aorta 

 Normal size aortic root and proximal ascending aorta. 

 

 CONCLUSIONS 

 Normal LV systolic function 

 Previewed by:  

 Dr. Tito Juarez MD 

 (Electronically Signed) 

 Final Date:      2022 16:26

## 2022-12-13 NOTE — EEG
ELECTROENCEPHALOGRAM REPORT



PREAMBLE:

This is a 42-year-old male with possible TIA.



EEG FINDINGS:

This is a 21-channel digital EEG recorded with video component, utilizing 10/20

international system with referential and bipolar montages.  Background consists of

well developed, well regulated moderate voltage activity in 10 hertz alpha.  Background

is posterior dominant and reactive to eye opening and closing.  Photic driving response

was not seen.  Some drowsiness was seen with appearance of bilaterally symmetric theta

frequency rhythm.  Deeper stages of sleep were not seen.  No focal or generalized

epileptiform activity was seen.



IMPRESSION:

This is a normal awake and drowsy EEG.  No focal, lateralized, or epileptiform activity

was seen.





MMODL / IJN: [id number] / Job#: [id number]

## 2022-12-29 ENCOUNTER — HOSPITAL ENCOUNTER (OUTPATIENT)
Dept: HOSPITAL 47 - CATHCVL | Age: 42
Discharge: HOME | End: 2022-12-29
Attending: INTERNAL MEDICINE
Payer: COMMERCIAL

## 2022-12-29 VITALS — TEMPERATURE: 97.8 F

## 2022-12-29 VITALS — HEART RATE: 67 BPM | SYSTOLIC BLOOD PRESSURE: 127 MMHG | DIASTOLIC BLOOD PRESSURE: 75 MMHG

## 2022-12-29 VITALS — RESPIRATION RATE: 16 BRPM

## 2022-12-29 VITALS — BODY MASS INDEX: 29.8 KG/M2

## 2022-12-29 DIAGNOSIS — E78.5: ICD-10-CM

## 2022-12-29 DIAGNOSIS — I51.7: Primary | ICD-10-CM

## 2022-12-29 DIAGNOSIS — Z91.018: ICD-10-CM

## 2022-12-29 DIAGNOSIS — Z79.82: ICD-10-CM

## 2022-12-29 DIAGNOSIS — Z88.0: ICD-10-CM

## 2022-12-29 DIAGNOSIS — Z79.02: ICD-10-CM

## 2022-12-29 DIAGNOSIS — G45.9: ICD-10-CM

## 2022-12-29 PROCEDURE — 93325 DOPPLER ECHO COLOR FLOW MAPG: CPT

## 2022-12-29 PROCEDURE — 93312 ECHO TRANSESOPHAGEAL: CPT

## 2022-12-29 PROCEDURE — 93320 DOPPLER ECHO COMPLETE: CPT

## 2022-12-29 NOTE — ECHOT
TRANSESOPHAGEAL ECHOCARDIOGRAM



INDICATIONS:

TIA, rule out cardiac source of thromboembolic phenomenon.



PROCEDURE NOTE:

After obtaining informed consent, transesophageal echocardiogram was performed in left

lateral position using an Omniplane probe.  Local and IV sedation were obtained by the

anesthetist.  We could not perform JIL under local and IV sedation.  Hence, we opted

for propofol.



FINDINGS:

1. There is no intracardiac thrombus within the left atrial appendage, left atrium,

    right atrium, right ventricle, or left ventricle.

2. Left ventricle has normal size and systolic function.

3. Right atrium and right ventricle appear mildly enlarged.

4. Interatrial septum, there is no evidence of left-to-right shunt by color-flow

    Doppler or right-to-left shunt by agitated saline contrast study.

5. Aortic valve appears normal.  There is no evidence of aortic stenosis or

    regurgitation.  There is trace mitral regurgitation. Tricuspid valve appears

    normal.

6. Aortic root measures within normal limits.  No significant atherosclerosis is

    noted.



CONCLUSION:

1. Normal LV systolic function.  No intracardiac thrombus.  No shunting across

    interatrial septum.

2. Slightly enlarged right atrium and right ventricle.





MMODL / IJN: [id number] / Job#: [id number] none

## 2024-12-11 ENCOUNTER — HOSPITAL ENCOUNTER (OUTPATIENT)
Dept: HOSPITAL 47 - 3 N SLEEP | Age: 44
End: 2024-12-11
Attending: INTERNAL MEDICINE
Payer: COMMERCIAL

## 2024-12-11 DIAGNOSIS — Z91.013: ICD-10-CM

## 2024-12-11 DIAGNOSIS — R06.83: Primary | ICD-10-CM

## 2024-12-11 DIAGNOSIS — Z88.0: ICD-10-CM

## 2024-12-11 DIAGNOSIS — Z91.018: ICD-10-CM

## 2024-12-17 NOTE — P.PCN
Date of Procedure: 12/11/24


Operative Findings: 


Home sleep study testing





Date of service is 12/11/2024





History





This is a 44-year-old male patient with reported history of snoring, wand 

excessive daytime sleepiness.  The patient has gained some weight recently.  He 

has no history of smoking.  He drinks alcohol socially.  He has a positive 

family of obstructive sleep apnea





Pertinent physical findings 


body mass index is 31 with a weight of 192








Technical description the [employer] Ap[employer] system was used to complete his home 

sleep study.  This is a type III home sleep study evaluation.  The total 

recording duration was 7 hours and 56 minutes.  The study started at 10:45 PM 

and ended at 6:41 AM.  There was a total of 7 hours and 44 minutes of flow 

monitoring and 7 hours and 45 minutes of oxygen saturation monitoring





Results


Respiratory analysis showed a total of 0 obstructive apneas and 20 obstructive 

hypopneas.  The resulting AHI was 2.6





Oxygenation analysis


No evidence of any significant nocturnal oxygen desaturation and patient is 

average pulse ox was 94% with a minimum pulse ox of 90% during sleep





Cardiac analysis


Average heart rate was 61-minute with a minimum heart rate of 49 and a maximum 

heart of 96





Assessment





Primary snoring, no evidence of any significant sleep breathing disorder


No evidence of any nocturnal oxygen desaturation





Plan





Negative study for obstructive sleep apnea


Will work on maintaining good sleep hygiene measures and good sleep hygiene 

principles


Avoid alcohol drinking at least 3 hours prior to going to bed


No need for CPAP therapy


Refer this patient back to primary care